# Patient Record
Sex: MALE | Race: WHITE | NOT HISPANIC OR LATINO | Employment: FULL TIME | URBAN - METROPOLITAN AREA
[De-identification: names, ages, dates, MRNs, and addresses within clinical notes are randomized per-mention and may not be internally consistent; named-entity substitution may affect disease eponyms.]

---

## 2020-10-22 ENCOUNTER — OFFICE VISIT (OUTPATIENT)
Dept: FAMILY MEDICINE | Facility: CLINIC | Age: 30
End: 2020-10-22
Payer: COMMERCIAL

## 2020-10-22 VITALS
BODY MASS INDEX: 21.65 KG/M2 | TEMPERATURE: 98 F | DIASTOLIC BLOOD PRESSURE: 86 MMHG | WEIGHT: 154.63 LBS | HEIGHT: 71 IN | HEART RATE: 102 BPM | OXYGEN SATURATION: 99 % | SYSTOLIC BLOOD PRESSURE: 124 MMHG

## 2020-10-22 DIAGNOSIS — Z00.00 HEALTH CARE MAINTENANCE: ICD-10-CM

## 2020-10-22 DIAGNOSIS — Z23 NEED FOR TD VACCINE: ICD-10-CM

## 2020-10-22 DIAGNOSIS — Z11.4 ENCOUNTER FOR SCREENING FOR HIV: ICD-10-CM

## 2020-10-22 DIAGNOSIS — R10.13 EPIGASTRIC PAIN: Primary | ICD-10-CM

## 2020-10-22 DIAGNOSIS — Z11.59 ENCOUNTER FOR HEPATITIS C SCREENING TEST FOR LOW RISK PATIENT: ICD-10-CM

## 2020-10-22 DIAGNOSIS — R19.4 CHANGE IN BOWEL HABITS: ICD-10-CM

## 2020-10-22 PROCEDURE — 90471 IMMUNIZATION ADMIN: CPT | Mod: S$GLB,,, | Performed by: INTERNAL MEDICINE

## 2020-10-22 PROCEDURE — 99204 OFFICE O/P NEW MOD 45 MIN: CPT | Mod: 25,S$GLB,, | Performed by: INTERNAL MEDICINE

## 2020-10-22 PROCEDURE — 90471 TD VACCINE GREATER THAN OR EQUAL TO 7YO PRESERVATIVE FREE IM: ICD-10-PCS | Mod: S$GLB,,, | Performed by: INTERNAL MEDICINE

## 2020-10-22 PROCEDURE — 90714 TD VACCINE GREATER THAN OR EQUAL TO 7YO PRESERVATIVE FREE IM: ICD-10-PCS | Mod: S$GLB,,, | Performed by: INTERNAL MEDICINE

## 2020-10-22 PROCEDURE — 99204 PR OFFICE/OUTPT VISIT, NEW, LEVL IV, 45-59 MIN: ICD-10-PCS | Mod: 25,S$GLB,, | Performed by: INTERNAL MEDICINE

## 2020-10-22 PROCEDURE — 99999 PR PBB SHADOW E&M-NEW PATIENT-LVL IV: ICD-10-PCS | Mod: PBBFAC,,, | Performed by: INTERNAL MEDICINE

## 2020-10-22 PROCEDURE — 99999 PR PBB SHADOW E&M-NEW PATIENT-LVL IV: CPT | Mod: PBBFAC,,, | Performed by: INTERNAL MEDICINE

## 2020-10-22 PROCEDURE — 90714 TD VACC NO PRESV 7 YRS+ IM: CPT | Mod: S$GLB,,, | Performed by: INTERNAL MEDICINE

## 2020-10-22 RX ORDER — DICYCLOMINE HYDROCHLORIDE 10 MG/1
10 CAPSULE ORAL 4 TIMES DAILY PRN
Qty: 120 CAPSULE | Refills: 0 | Status: SHIPPED | OUTPATIENT
Start: 2020-10-22 | End: 2020-11-21

## 2020-10-22 NOTE — PROGRESS NOTES
[facility]    Chief Complaint      Chief Complaint   Patient presents with    Establish Care     pt here to [facility], pt having stomach issues as well.     History of Present Illness      Domenic Colin is a 30 y.o. male who presents today for chief complaint ruq pain. pt is new to me    PCP: Primary Doctor No  Patient comes to appointment alone.     HPI   For a few weeks Has been having cramapy abd pain and loose stools in the morning for  With hematochezia yesterday and some assoc nausea, was tld may have had internal hemorrhoids in past when seen by gi, some mild mid epigastric burning type pain, also a mild cough he attributes to poss gerd   Symptoms resolve with BM, no unexplained weight loss,   Hx of accutane use     Active Problem List with Overview Notes    Diagnosis Date Noted    Epigastric pain 10/22/2020    Change in bowel habits 10/22/2020     Health Maintenance   Topic Date Due    TETANUS VACCINE  10/22/2030    Hepatitis C Screening  Completed    Lipid Panel  Completed       History reviewed. No pertinent past medical history.    History reviewed. No pertinent surgical history.    family history is not on file.     Social History     Tobacco Use    Smoking status: Never Smoker    Smokeless tobacco: Never Used   Substance Use Topics    Alcohol use: Not on file    Drug use: Not on file       Review of Systems   Constitutional: Negative for chills, fever, malaise/fatigue and weight loss.   Respiratory: Negative for cough, sputum production, shortness of breath and wheezing.    Gastrointestinal: Positive for abdominal pain, blood in stool, diarrhea and nausea. Negative for heartburn and vomiting.   Genitourinary: Negative for dysuria, frequency, hematuria and urgency.        Outpatient Encounter Medications as of 10/22/2020   Medication Sig Dispense Refill    dicyclomine (BENTYL) 10 MG capsule Take 1 capsule (10 mg total) by mouth 4 (four) times daily as  "needed. 120 capsule 0     No facility-administered encounter medications on file as of 10/22/2020.        Review of patient's allergies indicates:  No Known Allergies      Physical Exam      Vital Signs  Temp: 98.1 °F (36.7 °C)  Temp src: Oral  Pulse: 102  SpO2: 99 %  BP: 124/86  Pain Score:   2  Pain Loc: Abdomen  Height and Weight  Height: 5' 10.5" (179.1 cm)  Weight: 70.1 kg (154 lb 10.4 oz)  BSA (Calculated - sq m): 1.87 sq meters  BMI (Calculated): 21.9  Weight in (lb) to have BMI = 25: 176.4]    Physical Exam  Vitals signs reviewed.   Constitutional:       Appearance: He is well-developed.   HENT:      Head: Normocephalic and atraumatic.      Right Ear: External ear normal.      Left Ear: External ear normal.   Eyes:      General:         Right eye: No discharge.         Left eye: No discharge.   Neck:      Musculoskeletal: Normal range of motion.      Thyroid: No thyromegaly.   Cardiovascular:      Rate and Rhythm: Normal rate and regular rhythm.      Heart sounds: No murmur.   Pulmonary:      Effort: Pulmonary effort is normal. No respiratory distress.      Breath sounds: Normal breath sounds.   Abdominal:      General: Bowel sounds are normal. There is no distension.      Palpations: Abdomen is soft.      Tenderness: There is no abdominal tenderness.   Musculoskeletal: Normal range of motion.         General: No deformity.   Skin:     General: Skin is warm and dry.      Findings: No rash.   Neurological:      Mental Status: He is alert and oriented to person, place, and time.   Psychiatric:         Behavior: Behavior normal.          Laboratory:  CBC:  Recent Labs   Lab Result Units 10/22/20  1151   WBC K/uL 4.27   RBC M/uL 4.83   Hemoglobin g/dL 14.9   Hematocrit % 46.4   Platelets K/uL 189   Mean Corpuscular Volume fL 96   Mean Corpuscular Hemoglobin pg 30.8   Mean Corpuscular Hemoglobin Conc g/dL 32.1     CMP:  Recent Labs   Lab Result Units 10/22/20  1151   Glucose mg/dL 97   Calcium mg/dL 9.9   Albumin " g/dL 4.7   Total Protein g/dL 7.7   Sodium mmol/L 145   Potassium mmol/L 4.9   CO2 mmol/L 30*   Chloride mmol/L 105   BUN, Bld mg/dL 12   Alkaline Phosphatase U/L 51   ALT U/L 23   AST U/L 26   Total Bilirubin mg/dL 1.1*     URINALYSIS:  No results for input(s): COLORU, CLARITYU, SPECGRAV, PHUR, PROTEINUA, GLUCOSEU, BILIRUBINCON, BLOODU, WBCU, RBCU, BACTERIA, MUCUS, NITRITE, LEUKOCYTESUR, UROBILINOGEN, HYALINECASTS in the last 2160 hours.   LIPIDS:  Recent Labs   Lab Result Units 10/22/20  1151   HDL mg/dL 43   Cholesterol mg/dL 142   Triglycerides mg/dL 79   LDL Cholesterol mg/dL 83.2   Hdl/Cholesterol Ratio % 30.3   Non-HDL Cholesterol mg/dL 99   Total Cholesterol/HDL Ratio  3.3     TSH:  No results for input(s): TSH in the last 2160 hours.  A1C:  No results for input(s): HGBA1C in the last 2160 hours.    Radiology:      Assessment/Plan     Domenic Colin is a 30 y.o.male with:    Epigastric pain  Trial of bentyl and otc ppi  Labs and u/s today  Ref to gi to discuss scope     Change in bowel habits  Ref to gi for scope       Orders Placed This Encounter   Procedures    US Abdomen Complete     Standing Status:   Future     Number of Occurrences:   1     Standing Expiration Date:   10/22/2021     Order Specific Question:   May the Radiologist modify the order per protocol to meet the clinical needs of the patient?     Answer:   Yes    (In Office Administered) Td Vaccine - Preservative Free    CBC auto differential     Standing Status:   Future     Number of Occurrences:   1     Standing Expiration Date:   12/21/2021    Lipid Panel     Standing Status:   Future     Number of Occurrences:   1     Standing Expiration Date:   12/21/2021    Comprehensive Metabolic Panel     Standing Status:   Future     Number of Occurrences:   1     Standing Expiration Date:   12/21/2021    Lipase     Standing Status:   Future     Number of Occurrences:   1     Standing Expiration Date:   12/21/2021    Hepatitis C Antibody      Standing Status:   Future     Number of Occurrences:   1     Standing Expiration Date:   12/21/2021    HIV 1/2 Ag/Ab (4th Gen)     Standing Status:   Future     Number of Occurrences:   1     Standing Expiration Date:   12/21/2021    Ambulatory referral/consult to Gastroenterology     Standing Status:   Future     Standing Expiration Date:   11/22/2021     Referral Priority:   Routine     Referral Type:   Consultation     Referral Reason:   Specialty Services Required     Referred to Provider:   Letty Willis MD     Requested Specialty:   Gastroenterology     Number of Visits Requested:   1       Use of the [facility] Patient Portal discussed and encouraged during today's visit  -Continue current medications and maintain follow up with specialists.  Return to clinic in 6 months.  Future Appointments   Date Time Provider Department Center   11/3/2020  1:00 PM [facility] Kaylyn Merritt MD  10/25/2020 11:19 AM    Primary Care Internal Medicine - [facility]

## 2020-10-23 DIAGNOSIS — K81.9 GALL BLADDER INFLAMMATION: Primary | ICD-10-CM

## 2020-10-27 ENCOUNTER — TELEPHONE (OUTPATIENT)
Dept: FAMILY MEDICINE | Facility: CLINIC | Age: 30
End: 2020-10-27

## 2020-10-27 NOTE — TELEPHONE ENCOUNTER
Patient aware of ultrasound results and transferred to [facility] for referral

## 2020-10-27 NOTE — TELEPHONE ENCOUNTER
----- Message from Robin Khan sent at 10/27/2020 12:56 PM CDT -----  Regarding: Pt [phone]  Calling to get test results.  Name of test (lab, x-ray): US ABD  Date of test: 10/22/20    Comments: he wants to know why someone called him to schedule a pre op surgery for his gallbladder

## 2020-10-30 ENCOUNTER — OFFICE VISIT (OUTPATIENT)
Dept: SURGERY | Facility: CLINIC | Age: 30
End: 2020-10-30
Payer: COMMERCIAL

## 2020-10-30 VITALS
DIASTOLIC BLOOD PRESSURE: 69 MMHG | HEART RATE: 67 BPM | SYSTOLIC BLOOD PRESSURE: 130 MMHG | BODY MASS INDEX: 21.9 KG/M2 | WEIGHT: 156.44 LBS | HEIGHT: 71 IN

## 2020-10-30 DIAGNOSIS — K81.9 GALL BLADDER INFLAMMATION: ICD-10-CM

## 2020-10-30 DIAGNOSIS — R10.13 EPIGASTRIC ABDOMINAL PAIN: Primary | ICD-10-CM

## 2020-10-30 PROCEDURE — 99203 PR OFFICE/OUTPT VISIT, NEW, LEVL III, 30-44 MIN: ICD-10-PCS | Mod: S$GLB,,, | Performed by: SURGERY

## 2020-10-30 PROCEDURE — 99999 PR PBB SHADOW E&M-EST. PATIENT-LVL III: CPT | Mod: PBBFAC,,, | Performed by: SURGERY

## 2020-10-30 PROCEDURE — 99203 OFFICE O/P NEW LOW 30 MIN: CPT | Mod: S$GLB,,, | Performed by: SURGERY

## 2020-10-30 PROCEDURE — 99999 PR PBB SHADOW E&M-EST. PATIENT-LVL III: ICD-10-PCS | Mod: PBBFAC,,, | Performed by: SURGERY

## 2020-10-30 RX ORDER — SUCRALFATE 1 G/1
1 TABLET ORAL 3 TIMES DAILY
Qty: 30 TABLET | Refills: 0 | Status: SHIPPED | OUTPATIENT
Start: 2020-10-30 | End: 2020-11-09

## 2020-10-30 NOTE — H&P
[facility]  OUTPATIENT H&P    REASON FOR VISIT/CC:  Gallbladder inflammation    HPI: Domenic Colin is a 30 y.o. male referred for evaluation of possible gallbladder etiology for abdominal pain.  Patient reports crampy abdominal pain and loose stools for the past few (3-4) weeks.  This associated with some bright blood per rectum x1 and nausea (previously diagnosed with internal hemorrhoids).  Abdominal pain seems to be related in the epigastric region.  No direct relationship with food.  Symptoms do improve with bowel movement. Recently started Prilosec (unk dose) OTC for approx 3-4 days with minimal improvement.  Patient underwent ultrasound of the abdomen which showed a 1 distended gallbladder without stones and questionable wall thickening at 2.9 mm(?).    I have reviewed the patient's chart including prior progress notes, procedures and testing.     ROS:   Review of Systems   Constitutional: Positive for appetite change. Negative for activity change, fever and unexpected weight change.   Respiratory: Negative for apnea, chest tightness and shortness of breath.    Cardiovascular: Negative for chest pain, palpitations and leg swelling.   Gastrointestinal: Positive for abdominal pain, blood in stool, diarrhea and nausea.   Genitourinary: Negative for difficulty urinating, dysuria and hematuria.   Musculoskeletal: Negative for arthralgias, neck pain and neck stiffness.   Skin: Negative for color change, pallor and wound.   Neurological: Negative for dizziness, syncope and light-headedness.   Psychiatric/Behavioral: Negative for agitation, behavioral problems and confusion.       PROBLEM LIST:  Patient Active Problem List   Diagnosis    Epigastric pain    Change in bowel habits         HISTORY  History reviewed. No pertinent past medical history.    Past Surgical History:   Procedure Laterality Date    ABDOMINAL HERNIA REPAIR      rotator cuff Left        Social History     Tobacco Use     Smoking status: Never Smoker    Smokeless tobacco: Never Used   Substance Use Topics    Alcohol use: Not on file    Drug use: Not on file       History reviewed. No pertinent family history.      MEDS:  Current Outpatient Medications on File Prior to Visit   Medication Sig Dispense Refill    dicyclomine (BENTYL) 10 MG capsule Take 1 capsule (10 mg total) by mouth 4 (four) times daily as needed. (Patient not taking: Reported on 10/30/2020) 120 capsule 0     No current facility-administered medications on file prior to visit.        ALLERGIES:  Review of patient's allergies indicates:  No Known Allergies      VITALS:  Vitals:    10/30/20 1012   BP: 130/69   Pulse: 67         PHYSICAL EXAM:  Physical Exam  Constitutional:       General: He is not in acute distress.     Appearance: Normal appearance.   HENT:      Nose: Nose normal.   Eyes:      General: No scleral icterus.  Neck:      Musculoskeletal: Normal range of motion and neck supple. No neck rigidity.   Cardiovascular:      Rate and Rhythm: Normal rate and regular rhythm.      Pulses: Normal pulses.   Pulmonary:      Effort: Pulmonary effort is normal. No respiratory distress.      Breath sounds: Normal breath sounds.   Abdominal:      General: There is no distension.      Palpations: Abdomen is soft.      Tenderness: There is no abdominal tenderness.   Musculoskeletal: Normal range of motion.         General: No swelling or tenderness.   Skin:     General: Skin is warm and dry.      Coloration: Skin is not jaundiced.      Findings: No erythema.   Neurological:      General: No focal deficit present.      Mental Status: He is alert and oriented to person, place, and time.      Motor: No weakness.   Psychiatric:         Mood and Affect: Mood normal.         Behavior: Behavior normal.         Thought Content: Thought content normal.         Judgment: Judgment normal.           LABS:  Lab Results   Component Value Date    WBC 4.27 10/22/2020    RBC 4.83  10/22/2020    HGB 14.9 10/22/2020    HCT 46.4 10/22/2020     10/22/2020     Lab Results   Component Value Date    GLU 97 10/22/2020     10/22/2020    K 4.9 10/22/2020     10/22/2020    CO2 30 (H) 10/22/2020    BUN 12 10/22/2020    CREATININE 1.07 10/22/2020    CALCIUM 9.9 10/22/2020     Lab Results   Component Value Date    ALT 23 10/22/2020    AST 26 10/22/2020    ALKPHOS 51 10/22/2020    BILITOT 1.1 (H) 10/22/2020     No results found for: MG, PHOS    STUDIES:  Ultrasound abdomen images and reports were personally reviewed.  Impression:     Gallbladder is well distended with no evidence cholelithiasis.  Mild circumferential wall thickening at 2.9 mm.  Correlate with clinical and laboratory findings.     Mild prominence common hepatic duct with no evidence of choledocholithiasis.  Duct measures 5.7 mm maximum diameter.      ASSESSMENT & PLAN:  30 y.o. male with abdominal pain and diarrhea  - etiology not consistent with biliary colic  - not overly concerned about gallbladder wall being 2.9 mm which I would not consider thickened  - recommend continuing over-the-counter PPI  - Carafate prescribed in addition to PPI  - avoid NSAIDs  - keep appointment with GI for possible peptic ulcer disease versus IBS versus IBD  - May need HIDA if GI w/u is negative

## 2020-10-30 NOTE — LETTER
November 13, 2020      Laverne Merritt MD  [id number] [place] [id number]           [place] [address] [address] , [facility] [address]  [facility] [address]  Phone: [phone]  Fax: [phone]          Patient: Domenic Colin   MR Number: [id number]   YOB: 1990   Date of Visit: 10/30/2020       Dear Dr. Laverne Merritt:    Thank you for referring Domenic Colin to me for evaluation. Attached you will find relevant portions of my assessment and plan of care.    If you have questions, please do not hesitate to call me. I look forward to following Domenic Colin along with you.    Sincerely,    Tong Dolan Jr., MD    Enclosure  CC:  No Recipients    If you would like to receive this communication electronically, please contact [email or web address] or [phone] to request more information on [employer] access.    For providers and/or their staff who would like to refer a patient to [facility], please contact us through our one-stop-shop provider referral line, [facility], at [phone].    If you feel you have received this communication in error or would no longer like to receive these types of communications, please e-mail [email or web address]

## 2020-11-03 ENCOUNTER — OFFICE VISIT (OUTPATIENT)
Dept: GASTROENTEROLOGY | Facility: CLINIC | Age: 30
End: 2020-11-03
Payer: COMMERCIAL

## 2020-11-03 VITALS
DIASTOLIC BLOOD PRESSURE: 80 MMHG | WEIGHT: 157.63 LBS | SYSTOLIC BLOOD PRESSURE: 120 MMHG | BODY MASS INDEX: 22.29 KG/M2

## 2020-11-03 DIAGNOSIS — Z01.818 PREOPERATIVE EXAMINATION: ICD-10-CM

## 2020-11-03 DIAGNOSIS — R10.13 EPIGASTRIC PAIN: Primary | ICD-10-CM

## 2020-11-03 PROCEDURE — 99999 PR PBB SHADOW E&M-EST. PATIENT-LVL III: CPT | Mod: PBBFAC,,, | Performed by: NURSE PRACTITIONER

## 2020-11-03 PROCEDURE — 99203 PR OFFICE/OUTPT VISIT, NEW, LEVL III, 30-44 MIN: ICD-10-PCS | Mod: S$GLB,,, | Performed by: NURSE PRACTITIONER

## 2020-11-03 PROCEDURE — 99203 OFFICE O/P NEW LOW 30 MIN: CPT | Mod: S$GLB,,, | Performed by: NURSE PRACTITIONER

## 2020-11-03 PROCEDURE — 99999 PR PBB SHADOW E&M-EST. PATIENT-LVL III: ICD-10-PCS | Mod: PBBFAC,,, | Performed by: NURSE PRACTITIONER

## 2020-11-03 RX ORDER — OMEPRAZOLE 40 MG/1
40 CAPSULE, DELAYED RELEASE ORAL DAILY
Qty: 30 CAPSULE | Refills: 2 | Status: SHIPPED | OUTPATIENT
Start: 2020-11-03 | End: 2021-05-06 | Stop reason: SDUPTHER

## 2020-11-03 NOTE — PATIENT INSTRUCTIONS
Abdominal Pain    Abdominal pain is pain in the stomach or belly area. Everyone has this pain from time to time. In many cases it goes away on its own. But abdominal pain can sometimes be due to a serious problem, such as appendicitis. So its important to know when to seek help.  Causes of abdominal pain  There are many possible causes of abdominal pain. Common causes in adults include:  · Constipation, diarrhea, or gas  · Stomach acid flowing back up into the esophagus (acid reflux or heartburn)  · Severe acid reflux, called GERD (gastroesophageal reflux disease)  · A sore in the lining of the stomach or small intestine (peptic ulcer)  · Inflammation of the gallbladder, liver, or pancreas  · Gallstones or kidney stones  · Appendicitis   · Intestinal blockage   · An internal organ pushing through a muscle or other tissue (hernia)  · Urinary tract infections  · In women, menstrual cramps, fibroids, or endometriosis  · Inflammation or infection of the intestines  Diagnosing the cause of abdominal pain  Your healthcare provider will do a physical exam help find the cause of your pain. If needed, tests will be ordered. Belly pain has many possible causes. So it can be hard to find the reason for your pain. Giving details about your pain can help. Tell your provider where and when you feel the pain, and what makes it better or worse. Also let your provider know if you have other symptoms such as:  · Fever  · Tiredness  · Upset stomach (nausea)  · Vomiting  · Changes in bathroom habits  Treating abdominal pain  Some causes of pain need emergency medical treatment right away. These include appendicitis or a bowel blockage. Other problems can be treated with rest, fluids, or medicines. Your healthcare provider can give you specific instructions for treatment or self-care based on what is causing your pain.  If you have vomiting or diarrhea, sip water or other clear fluids. When you are ready to eat solid foods again,  start with small amounts of easy-to-digest, low-fat foods. These include apple sauce, toast, or crackers.   When to seek medical care  Call 911 or go to the hospital right away if you:  · Cant pass stool and are vomiting  · Are vomiting blood or have bloody diarrhea or black, tarry diarrhea  · Have chest, neck, or shoulder pain  · Feel like you might pass out  · Have pain in your shoulder blades with nausea  · Have sudden, severe belly pain  · Have new, severe pain unlike any you have felt before  · Have a belly that is rigid, hard, and tender to touch  Call your healthcare provider if you have:  · Pain for more than 5 days  · Bloating for more than 2 days  · Diarrhea for more than 5 days  · A fever of 100.4°F (38.0°C) or higher, or as directed by your provider  · Pain that gets worse  · Weight loss for no reason  · Continued lack of appetite  · Blood in your stool  How to prevent abdominal pain  Here are some tips to help prevent abdominal pain:  · Eat smaller amounts of food at one time.  · Avoid greasy, fried, or other high-fat foods.  · Avoid foods that give you gas.  · Exercise regularly.  · Drink plenty of fluids.  To help prevent GERD symptoms:  · Quit smoking.  · Reduce alcohol and certain foods that increase stomach acid.  · Avoid aspirin and over-the-counter pain and fever medicines (NSAIDS or nonsteroidal anti-inflammatory drugs), if possible  · Lose extra weight.  · Finish eating at least 2 hours before you go to bed or lie down.  · Raise the head of your bed.  Date Last Reviewed: 7/1/2016  © 0589-0546 [employer]. [address] [place] [address], [place] [address]. All rights reserved. This information is not intended as a substitute for professional medical care. Always follow your healthcare professional's instructions.      EGD Prep Instructions    [facility]  [address] [facility] in [facility] Office [phone]  Endoscopy Lab [phone]    You are scheduled  for an EGD with Dr. Willis on 12/2/20 at [facility].  You will enter through the [facility] Entrance and check in at Same Day Surgery.    Nothing to eat or drink after midnight before the procedure.  You MAY brush your teeth.    You MAY take your blood pressure, heart, and seizure medication on the morning of the procedure, with a SIP of water.  Hold ALL other medications until after the procedure.    If you are on blood thinners THAT YOU HAVE BEEN INSTRUCTED TO HOLD BY YOUR DOCTOR FOR THIS PROCEDURE, then do NOT take this the morning of your EGD.  Do NOT stop these medications on your own, they must be approved to be held by your doctor.  Your EGD can NOT be done if you are on these medications.  Examples of blood thinners include: Coumadin, Aggrenox, Plavix, Pradaxa, Reapro, Pletal, Xarelto, Ticagrelor, Brilinta, Eliquis, and high dose aspirin (325 mg).  You do not have to stop baby aspirin 81 mg.    You will receive a call 2-3 days before your EGD to tell you the time to arrive.  If you have not received a call by the day before your procedure, call the Endoscopy Lab at [phone].

## 2020-11-03 NOTE — PROGRESS NOTES
"Subjective:       Patient ID: Domenic Colin is a 30 y.o. male.    Chief Complaint: Abdominal Pain and Diarrhea    29 y/o male referred by PCP for abdominal pain. Patient reports intermittent abdominal pain and diarrhea for several weeks. Reports epigastric burning and nausea that is worse after a "heavy" meal. Diarrhea episodes usually occur 1-2x/morning but reports no diarrhea in 2 days. Taking omeprazole 20 mg daily with mild symptom relief. Denies fever, fatigue, wt loss, blood in stool, or melena. No FH of IBD, colon or stomach cancer.       History reviewed. No pertinent past medical history.    Past Surgical History:   Procedure Laterality Date    ABDOMINAL HERNIA REPAIR      rotator cuff Left        History reviewed. No pertinent family history.    Social History     Socioeconomic History    Marital status: Single     Spouse name: Not on file    Number of children: Not on file    Years of education: Not on file    Highest education level: Not on file   Occupational History    Not on file   Social Needs    Financial resource strain: Not on file    Food insecurity     Worry: Not on file     Inability: Not on file    Transportation needs     Medical: Not on file     Non-medical: Not on file   Tobacco Use    Smoking status: Never Smoker    Smokeless tobacco: Never Used   Substance and Sexual Activity    Alcohol use: Not on file    Drug use: Not on file    Sexual activity: Not on file   Lifestyle    Physical activity     Days per week: Not on file     Minutes per session: Not on file    Stress: Not on file   Relationships    Social connections     Talks on phone: Not on file     Gets together: Not on file     Attends Christian service: Not on file     Active member of club or organization: Not on file     Attends meetings of clubs or organizations: Not on file     Relationship status: Not on file   Other Topics Concern    Not on file   Social History Narrative    Not on file       Review " of Systems   Constitutional: Negative for appetite change, fatigue, fever and unexpected weight change.   HENT: Negative for sore throat and trouble swallowing.    Eyes: Negative for visual disturbance.   Respiratory: Negative for shortness of breath.    Cardiovascular: Negative for chest pain and palpitations.   Gastrointestinal: Positive for abdominal pain.   Musculoskeletal: Negative for back pain.   Neurological: Negative for weakness and headaches.   Hematological: Negative for adenopathy. Does not bruise/bleed easily.   Psychiatric/Behavioral: Negative for dysphoric mood.         Objective:     Vitals:    11/03/20 1254   BP: 120/80   BP Location: Left arm   Patient Position: Sitting   BP Method: Medium (Manual)   Weight: 71.5 kg (157 lb 9.6 oz)          Physical Exam  Constitutional:       Appearance: He is well-developed.   HENT:      Head: Normocephalic.   Eyes:      Conjunctiva/sclera: Conjunctivae normal.      Pupils: Pupils are equal, round, and reactive to light.   Neck:      Musculoskeletal: Normal range of motion and neck supple.   Cardiovascular:      Rate and Rhythm: Normal rate and regular rhythm.   Pulmonary:      Effort: Pulmonary effort is normal.      Breath sounds: Normal breath sounds.   Abdominal:      General: Bowel sounds are normal.      Palpations: Abdomen is soft.      Tenderness: There is no abdominal tenderness.   Musculoskeletal: Normal range of motion.   Skin:     General: Skin is warm and dry.   Neurological:      Mental Status: He is alert and oriented to person, place, and time.   Psychiatric:         Mood and Affect: Mood normal.         Behavior: Behavior normal.     US Abdomen Complete  Narrative: EXAMINATION:  US ABDOMEN COMPLETE    CLINICAL HISTORY:  Epigastric pain    TECHNIQUE:  Complete abdominal ultrasound (including pancreas, aorta, liver, gallbladder, common bile duct, IVC, kidneys, and spleen) was performed.    COMPARISON:  None    FINDINGS:  Pancreas and aorta normal  in appearance.  IVC visualized.    Gallbladder is well distended with no evidence cholelithiasis or cholecystitis.  Wall thickness upper limits normal at 2.9 mm.  Negative ultrasonic Franklin sign noted and there is no pericholecystic fluid.  Correlation should be made with clinical and laboratory findings given the above described wall thickness.    No intrahepatic ductal dilatation.  Mild prominence common hepatic duct 5.7 mm.  No evidence of choledocholithiasis.    Liver normal in size at 15.3 cm craniocaudal length.  Uniform echotexture throughout with no cystic or solid lesion identified.    Kidneys normal in symmetric in size and echotexture.  Right measures 10.2 and the left 9.3 cm in length.  No hydronephrosis or nephrolithiasis.    Spleen normal in size and uniform in echotexture.  Measures 8.3 x 3.3 cm.  Impression: Gallbladder is well distended with no evidence cholelithiasis.  Mild circumferential wall thickening at 2.9 mm.  Correlate with clinical and laboratory findings.    Mild prominence common hepatic duct with no evidence of choledocholithiasis.  Duct measures 5.7 mm maximum diameter.    Additional findings as above.  Follow-up and or further evaluation as warranted.    Electronically signed by: Margarito Orta MD  Date:    10/22/2020  Time:    16:16    Lab Results   Component Value Date    WBC 4.27 10/22/2020    HGB 14.9 10/22/2020    HCT 46.4 10/22/2020    MCV 96 10/22/2020     10/22/2020     BMP  Lab Results   Component Value Date     10/22/2020    K 4.9 10/22/2020     10/22/2020    CO2 30 (H) 10/22/2020    BUN 12 10/22/2020    CREATININE 1.07 10/22/2020    CALCIUM 9.9 10/22/2020    ANIONGAP 10 10/22/2020    ESTGFRAFRICA >60.0 10/22/2020    EGFRNONAA >60.0 10/22/2020           Assessment:         ICD-10-CM ICD-9-CM   1. Epigastric pain  R10.13 789.06   2. Preoperative examination  Z01.818 V72.84       Plan:       Epigastric pain  -     Case request GI: EGD  (ESOPHAGOGASTRODUODENOSCOPY)  -     omeprazole (PRILOSEC) 40 MG capsule; Take 1 capsule (40 mg total) by mouth once daily.  Dispense: 30 capsule; Refill: 2    Preoperative examination  -     COVID-19 Routine Screening; Future; Expected date: 11/03/2020    If EGD negative and symptoms persist, will schedule HIDA scan.  Follow up if symptoms worsen or fail to improve.     Patient's Medications   New Prescriptions    OMEPRAZOLE (PRILOSEC) 40 MG CAPSULE    Take 1 capsule (40 mg total) by mouth once daily.   Previous Medications    DICYCLOMINE (BENTYL) 10 MG CAPSULE    Take 1 capsule (10 mg total) by mouth 4 (four) times daily as needed.    SUCRALFATE (CARAFATE) 1 GRAM TABLET    Take 1 tablet (1 g total) by mouth 3 (three) times daily. for 10 days   Modified Medications    No medications on file   Discontinued Medications    No medications on file

## 2020-11-03 NOTE — LETTER
November 3, 2020      Laverne Merritt MD  [id number] [place], [place] [address] [place] [address]           [facility]  [facility] [address]   [facility] [address]  Phone: [phone]  Fax: [phone]          Patient: Domenic Colin   MR Number: [id number]   YOB: 1990   Date of Visit: 11/3/2020       Dear Dr. Laverne Merritt:    Thank you for referring Domenic Colin to me for evaluation. Attached you will find relevant portions of my assessment and plan of care.    If you have questions, please do not hesitate to call me. I look forward to following Domenic Colin along with you.    Sincerely,    Morgan Luciano, [place]    Enclosure  CC:  No Recipients    If you would like to receive this communication electronically, please contact [email or web address] or [phone] to request more information on [facility] [employer] access.    For providers and/or their staff who would like to refer a patient to [facility], please contact us through our one-stop-shop provider referral line, [facility], at [phone].    If you feel you have received this communication in error or would no longer like to receive these types of communications, please e-mail [email or web address]

## 2020-12-10 ENCOUNTER — TELEPHONE (OUTPATIENT)
Dept: GASTROENTEROLOGY | Facility: CLINIC | Age: 30
End: 2020-12-10

## 2020-12-10 NOTE — TELEPHONE ENCOUNTER
----- Message from Letty Willis MD sent at 12/9/2020  8:08 AM CST -----  HP (-), cont current meds, add Ca/Vit D.  RTC as needed

## 2020-12-18 ENCOUNTER — TELEPHONE (OUTPATIENT)
Dept: GASTROENTEROLOGY | Facility: CLINIC | Age: 30
End: 2020-12-18

## 2021-05-04 ENCOUNTER — PATIENT MESSAGE (OUTPATIENT)
Dept: RESEARCH | Facility: HOSPITAL | Age: 31
End: 2021-05-04

## 2021-05-06 DIAGNOSIS — R10.13 EPIGASTRIC PAIN: ICD-10-CM

## 2021-05-07 RX ORDER — OMEPRAZOLE 40 MG/1
40 CAPSULE, DELAYED RELEASE ORAL DAILY
Qty: 30 CAPSULE | Refills: 5 | Status: SHIPPED | OUTPATIENT
Start: 2021-05-07 | End: 2022-05-07